# Patient Record
Sex: MALE | Race: WHITE | URBAN - METROPOLITAN AREA
[De-identification: names, ages, dates, MRNs, and addresses within clinical notes are randomized per-mention and may not be internally consistent; named-entity substitution may affect disease eponyms.]

---

## 2017-07-20 ENCOUNTER — OFFICE VISIT (OUTPATIENT)
Dept: OPHTHALMOLOGY | Facility: CLINIC | Age: 67
End: 2017-07-20
Attending: OPHTHALMOLOGY
Payer: COMMERCIAL

## 2017-07-20 DIAGNOSIS — H40.1130 PRIMARY OPEN ANGLE GLAUCOMA OF BOTH EYES, UNSPECIFIED GLAUCOMA STAGE: ICD-10-CM

## 2017-07-20 DIAGNOSIS — H25.13 SENILE NUCLEAR SCLEROSIS, BILATERAL: ICD-10-CM

## 2017-07-20 DIAGNOSIS — H40.003 GLAUCOMA SUSPECT, BILATERAL: Primary | ICD-10-CM

## 2017-07-20 PROCEDURE — 92083 EXTENDED VISUAL FIELD XM: CPT | Mod: ZF | Performed by: OPHTHALMOLOGY

## 2017-07-20 PROCEDURE — 99213 OFFICE O/P EST LOW 20 MIN: CPT | Mod: ZF

## 2017-07-20 PROCEDURE — 92015 DETERMINE REFRACTIVE STATE: CPT | Mod: ZF

## 2017-07-20 PROCEDURE — 92133 CPTRZD OPH DX IMG PST SGM ON: CPT | Mod: ZF | Performed by: OPHTHALMOLOGY

## 2017-07-20 RX ORDER — TIMOLOL MALEATE 6.8 MG/ML
SOLUTION/ DROPS OPHTHALMIC
COMMUNITY

## 2017-07-20 RX ORDER — LORATADINE 10 MG/1
10 TABLET ORAL
COMMUNITY

## 2017-07-20 ASSESSMENT — REFRACTION_WEARINGRX
OS_ADD: +2.50
OS_AXIS: 095
OS_AXIS: 085
OS_ADD: +2.25
OS_SPHERE: -3.50
OS_CYLINDER: +0.50
OD_SPHERE: -3.50
OS_SPHERE: -5.50
OD_ADD: +2.25
OS_CYLINDER: +0.50
OS_AXIS: 085
OD_CYLINDER: SPHERE
OD_SPHERE: -5.50
OD_CYLINDER: +0.50
OD_SPHERE: -5.25
OD_AXIS: 065
OS_CYLINDER: +0.25
OD_ADD: +2.50
OS_SPHERE: -5.50
OD_CYLINDER: SPHERE

## 2017-07-20 ASSESSMENT — VISUAL ACUITY
CORRECTION_TYPE: GLASSES
OD_CC: 20/20
OS_CC: 20/25
OS_CC+: -
METHOD: SNELLEN - LINEAR
OD_CC+: -2

## 2017-07-20 ASSESSMENT — CONF VISUAL FIELD
OS_NORMAL: 1
METHOD: COUNTING FINGERS
OD_NORMAL: 1

## 2017-07-20 ASSESSMENT — PACHYMETRY
OS_CT(UM): 624
OD_CT(UM): 636

## 2017-07-20 ASSESSMENT — REFRACTION_MANIFEST
OS_AXIS: 120
OS_CYLINDER: +0.25
OD_CYLINDER: +0.50
OS_ADD: +2.50
OD_SPHERE: -5.00
OD_AXIS: 065
OD_ADD: +2.50
OS_SPHERE: -4.25

## 2017-07-20 ASSESSMENT — EXTERNAL EXAM - RIGHT EYE: OD_EXAM: NORMAL

## 2017-07-20 ASSESSMENT — CUP TO DISC RATIO
OS_RATIO: 0.75
OD_RATIO: 0.7

## 2017-07-20 ASSESSMENT — TONOMETRY
IOP_METHOD: APPLANATION
OD_IOP_MMHG: 21
OS_IOP_MMHG: 23

## 2017-07-20 ASSESSMENT — SLIT LAMP EXAM - LIDS
COMMENTS: NORMAL
COMMENTS: NORMAL

## 2017-07-20 ASSESSMENT — EXTERNAL EXAM - LEFT EYE: OS_EXAM: NORMAL

## 2017-07-20 NOTE — PROGRESS NOTES
1)Glc Suspect -- started on gtts in 2017 by Dr Sunshine after ?prog on OCT --  K pachy: 636/624 Tmax:27/27 HVF: Fluct CDR:0.7/0.75 HRT/OCT: Mild RNFl thinning FHX of Glc:No Gonio:open Intolerant to: Asthma/COPD:No Steroid Use: No Kidney Stones: No Sulfa Allergy: No IOP targets: L-M 20s -- IOP good -- needs repeat Photos  2)NS OU  3)MARKO    Patient will return to clinic in 4-6 months with repeat IOP check and undilated iris check and gonioscopy.  A long discussion of the risks, benefits, and alternatives including potential treatment and management options were had with patient and a decision was made to trial off drops.  Patient will discontinue and hold onto the drops 2 months prior to his next appointment.    Attending Physician Attestation:  Complete documentation of historical and exam elements from today's encounter can be found in the full encounter summary report (not reduplicated in this progress note). I personally obtained the chief complaint(s) and history of present illness.  I confirmed and edited as necessary the review of systems, past medical/surgical history, family history, social history, and examination findings as documented by others; and I examined the patient myself. I personally reviewed the relevant tests, images, and reports as documented above. I formulated and edited as necessary the assessment and plan and discussed the findings and management plan with the patient and family.  - Ivette Cline MD

## 2017-07-20 NOTE — PATIENT INSTRUCTIONS
Patient will return to clinic in 4-6 months with repeat IOP check and undilated iris check and gonioscopy.  A long discussion of the risks, benefits, and alternatives including potential treatment and management options were had with patient and a decision was made to trial off drops.  Patient will discontinue and hold onto the drops 2 months prior to his next appointment.    Patient will start warm compresses 2x/day, increase dietary flax seed/fish oil dietary supplementation, and start artificial tears 4-6x/day as needed for burning, tearing, mattering, foreign body sensation, blurred vision, etc.  Artifical tear drops are available over the counter. Below are a list of the some of the drops available:  Refresh   Systane  Theratears  Genteal  Blink  Optive      Please avoid Visine (unless Visine Puretears), Murine or Cleareyes or Puralube tear drops.    These have ingredients that take the red out and but actually increase dryness and redness of the eyes over time

## 2017-07-20 NOTE — NURSING NOTE
Chief Complaints and History of Present Illnesses   Patient presents with     Consult For     POAG     HPI    Affected eye(s):  Both   Symptoms:        Frequency:  Constant       Do you have eye pain now?:  No      Comments:  June 2016 Dx with POAG  States that the va has changed a bit at dist  +dry and water  Temi Wallace COT 8:39 AM July 20, 2017

## 2017-07-20 NOTE — MR AVS SNAPSHOT
After Visit Summary   7/20/2017    Juice Gomez    MRN: [id number]           Patient Information     Date Of Birth          1950        Visit Information        Provider Department      7/20/2017 9:00 AM Ivette Cline MD [facility]        Today's Diagnoses     Glaucoma suspect, bilateral    -  1    Primary open angle glaucoma of both eyes, unspecified glaucoma stage        Senile nuclear sclerosis, bilateral          Care Instructions    Patient will return to clinic in 4-6 months with repeat IOP check and undilated iris check and gonioscopy.  A long discussion of the risks, benefits, and alternatives including potential treatment and management options were had with patient and a decision was made to trial off drops.  Patient will discontinue and hold onto the drops 2 months prior to his next appointment.    Patient will start warm compresses 2x/day, increase dietary flax seed/fish oil dietary supplementation, and start artificial tears 4-6x/day as needed for burning, tearing, mattering, foreign body sensation, blurred vision, etc.  Artifical tear drops are available over the counter. Below are a list of the some of the drops available:  Refresh   Systane  Theratears  Genteal  Blink  Optive      Please avoid Visine (unless Visine Puretears), Murine or Cleareyes or Puralube tear drops.    These have ingredients that take the red out and but actually increase dryness and redness of the eyes over time            Follow-ups after your visit        Follow-up notes from your care team     Return 4-6 months with repeat IOP check and undilated iris check and gonioscopy.      Who to contact     Please call your clinic at [phone] to:    Ask questions about your health    Make or cancel appointments    Discuss your medicines    Learn about your test results    Speak to your doctor   If you have compliments or concerns about an experience at your clinic, or if you wish to file a complaint,  please contact [facility] Patient Relations at [phone] or email us at [email or web address]         Additional Information About Your Visit        [employer] Information     [employer] gives you secure access to your electronic health record. If you see a primary care provider, you can also send messages to your care team and make appointments. If you have questions, please call your primary care clinic.  If you do not have a primary care provider, please call [phone] and they will assist you.      [employer] is an electronic gateway that provides easy, online access to your medical records. With [employer], you can request a clinic appointment, read your test results, renew a prescription or communicate with your care team.     To access your existing account, please contact your [facility] or call [phone] for assistance.        Care EveryWhere ID     This is your Care EveryWhere ID. This could be used by other organizations to access your [place] medical records  [id number]         Blood Pressure from Last 3 Encounters:   No data found for BP    Weight from Last 3 Encounters:   No data found for Wt              We Performed the Following     OCT Optic Nerve RNFL Spectralis OU (both eyes)     OVF 24-2 Dynamic OU     Pachymetry OU (both eyes)        Primary Care Provider Office Phone # Fax #    Oseas Figueroa [phone]       [facility] [address] [facility] [address]        Equal Access to Services     LALIT HINOJOSA : Hadii aad ku hadasho Sochristopher, waaxda luqadaha, qaybta kaalmada adeleonidasyada, marina andres . So [facility] [place] [facility] [phone].    ATENCIÓN: Si habla español, tiene a werner disposición servicios gratuitos de asistencia lingüística. Llame al [phone].    We comply with applicable federal civil rights laws and [place] laws. We do not discriminate on the basis of race, color, national  origin, age, disability sex, sexual orientation or gender identity.            Thank you!     Thank you for choosing EYE CLINIC  for your care. Our goal is always to provide you with excellent care. Hearing back from our patients is one way we can continue to improve our services. Please take a few minutes to complete the written survey that you may receive in the mail after your visit with us. Thank you!             Your Updated Medication List - Protect others around you: Learn how to safely use, store and throw away your medicines at www.disposemymeds.org.          This list is accurate as of: 7/20/17 10:10 AM.  Always use your most recent med list.                   Brand Name Dispense Instructions for use Diagnosis    ARTIFICIAL TEAR OP      Apply to eye as needed        loratadine 10 MG tablet    CLARITIN     Take 10 mg by mouth    Primary open angle glaucoma of both eyes, unspecified glaucoma stage       METAMUCIL 30.9 % Powd   Generic drug:  psyllium      1 tsp in am        MULTIVITAMIN ADULT PO           timolol maleate 0.5 % opthalmic solution    ISTALOL      Primary open angle glaucoma of both eyes, unspecified glaucoma stage

## 2018-01-25 ENCOUNTER — OFFICE VISIT (OUTPATIENT)
Dept: OPHTHALMOLOGY | Facility: CLINIC | Age: 68
End: 2018-01-25
Attending: OPHTHALMOLOGY
Payer: MEDICARE

## 2018-01-25 DIAGNOSIS — H25.13 SENILE NUCLEAR SCLEROSIS, BILATERAL: ICD-10-CM

## 2018-01-25 DIAGNOSIS — H40.003 GLAUCOMA SUSPECT, BILATERAL: Primary | ICD-10-CM

## 2018-01-25 PROCEDURE — 92020 GONIOSCOPY: CPT | Mod: ZF | Performed by: OPHTHALMOLOGY

## 2018-01-25 PROCEDURE — G0463 HOSPITAL OUTPT CLINIC VISIT: HCPCS | Mod: ZF

## 2018-01-25 ASSESSMENT — REFRACTION_WEARINGRX
OS_SPHERE: -4.25
OD_CYLINDER: +0.75
OS_AXIS: 104
OS_CYLINDER: +0.25
OD_ADD: +2.50
OD_AXIS: 073
OD_SPHERE: -5.00
OS_ADD: +2.50

## 2018-01-25 ASSESSMENT — GONIOSCOPY
OS_INFERIOR: 4
OD_NASAL: 4
OD_SUPERIOR: 4
OD_INFERIOR: 4
OD_TEMPORAL: 4
ADDITIONAL_COMMENTS: 1-2+ TMP OU
OS_TEMPORAL: 4
OS_NASAL: 4
OS_SUPERIOR: 4

## 2018-01-25 ASSESSMENT — CONF VISUAL FIELD
OS_NORMAL: 1
OD_NORMAL: 1

## 2018-01-25 ASSESSMENT — VISUAL ACUITY
OD_CC+: -1
OS_CC: 20/20
METHOD: SNELLEN - LINEAR
CORRECTION_TYPE: GLASSES
OD_CC: 20/20

## 2018-01-25 ASSESSMENT — TONOMETRY
OD_IOP_MMHG: 25
OS_IOP_MMHG: 24
IOP_METHOD: APPLANATION

## 2018-01-25 ASSESSMENT — EXTERNAL EXAM - LEFT EYE: OS_EXAM: NORMAL

## 2018-01-25 ASSESSMENT — SLIT LAMP EXAM - LIDS
COMMENTS: NORMAL
COMMENTS: NORMAL

## 2018-01-25 ASSESSMENT — EXTERNAL EXAM - RIGHT EYE: OD_EXAM: NORMAL

## 2018-01-25 NOTE — NURSING NOTE
Chief Complaints and History of Present Illnesses   Patient presents with     Follow Up For     Glaucoma suspect     HPI    Affected eye(s):  Both   Symptoms:        Duration:  1 year   Frequency:  Constant       Do you have eye pain now?:  No      Comments:  Pt. States that he is here for IOP check after stopping timolol in September 17th.  No change in VA BE.  Still dryness BE.  Charisma De Leon COT 9:39 AM January 25, 2018                         15-Nov-2017 14:28

## 2018-01-25 NOTE — PATIENT INSTRUCTIONS
Patient will return to clinic in 6 months with repeat IOP check, visual field test, dilated eye exam and OCT with RNFl analysis.  A long discussion of the risks, benefits, and alternatives including potential treatment and management options were had with patient and a decision was made to remain off drops.

## 2018-01-25 NOTE — MR AVS SNAPSHOT
After Visit Summary   1/25/2018    Juice Gomez    MRN: [id number]           Patient Information     Date Of Birth          1950        Visit Information        Provider Department      1/25/2018 9:15 AM Ivette Cline MD Eye Clinic        Today's Diagnoses     Glaucoma suspect, bilateral    -  1    Senile nuclear sclerosis, bilateral          Care Instructions    Patient will return to clinic in 6 months with repeat IOP check, visual field test, dilated eye exam and OCT with RNFl analysis.  A long discussion of the risks, benefits, and alternatives including potential treatment and management options were had with patient and a decision was made to remain off drops.           Follow-ups after your visit        Follow-up notes from your care team     Return  6 months with repeat IOP check, visual field test, dilated eye exam and OCT with RNFl analysis..      Your next 10 appointments already scheduled     Jun 21, 2018  8:00 AM CDT   RETURN GLAUCOMA with Ivette Cline MD   Eye Clinic ([facility])    [address]  [address]  [address]  [facility] [id number] [phone]              Who to contact     Please call your clinic at [phone] to:    Ask questions about your health    Make or cancel appointments    Discuss your medicines    Learn about your test results    Speak to your doctor   If you have compliments or concerns about an experience at your clinic, or if you wish to file a complaint, please contact [facility] Patient Relations at [phone] or email us at [email or web address]         Additional Information About Your Visit        MyChart Information     [employer] gives you secure access to your electronic health record. If you see a primary care provider, you can also send messages to your care team and make appointments. If you have questions, please call your primary care clinic.  If you do not  have a primary care provider, please call [phone] and they will assist you.      [employer] is an electronic gateway that provides easy, online access to your medical records. With [employer], you can request a clinic appointment, read your test results, renew a prescription or communicate with your care team.     To access your existing account, please contact your [facility] or call [phone] for assistance.        Care EveryWhere ID     This is your Care EveryWhere ID. This could be used by other organizations to access your [place] medical records  [id number]         Blood Pressure from Last 3 Encounters:   No data found for BP    Weight from Last 3 Encounters:   No data found for Wt              We Performed the Following     GONIOSCOPY        Primary Care Provider Office Phone # Fax #    Oseas Figueroa [phone]       [employer] [address] [facility] [address] [facility] [address]        Equal Access to Services     [facility] : Hadii aad ku hadasho Soomaali, waaxda luqadaha, qaybta kaalmada adeegyada, waxay idiin hayaan jeaneth ribeiroarashanelle andres . So [facility] [phone].    ATENCIÓN: Si habla español, tiene a werner disposición servicios gratuitos de asistencia lingüística. Catalina al [phone].    We comply with applicable federal civil rights laws and [place] laws. We do not discriminate on the basis of race, color, national origin, age, disability, sex, sexual orientation, or gender identity.            Thank you!     Thank you for choosing EYE CLINIC  for your care. Our goal is always to provide you with excellent care. Hearing back from our patients is one way we can continue to improve our services. Please take a few minutes to complete the written survey that you may receive in the mail after your visit with us. Thank you!             Your Updated Medication List - Protect others around you: Learn how to safely use, store and throw away your medicines at  www.disposemymeds.org.          This list is accurate as of 1/25/18 10:52 AM.  Always use your most recent med list.                   Brand Name Dispense Instructions for use Diagnosis    ARTIFICIAL TEAR OP      Apply to eye as needed        loratadine 10 MG tablet    CLARITIN     Take 10 mg by mouth    Primary open angle glaucoma of both eyes, unspecified glaucoma stage       METAMUCIL 30.9 % Powd   Generic drug:  psyllium      1 tsp in am        MULTIVITAMIN ADULT PO           timolol maleate 0.5 % opthalmic solution    ISTALOL      Primary open angle glaucoma of both eyes, unspecified glaucoma stage

## 2018-01-25 NOTE — PROGRESS NOTES
1)Glc Suspect -- started on gtts in 2017 by Dr Sunshine after ?prog on OCT --  K pachy: 636/624 Tmax:27/27 HVF: Fluct CDR:0.7/0.75 HRT/OCT: Mild RNFl thinning FHX of Glc:No Gonio:open Intolerant to: Asthma/COPD:No Steroid Use: No Kidney Stones: No Sulfa Allergy: No IOP targets: L-M 20s -- IOP good -- needs repeat Photos -- IOP borderline off T1/2  2)NS OU  3)MARKO    Patient will return to clinic in 6 months with repeat IOP check, visual field test, dilated eye exam and OCT with RNFl analysis.  A long discussion of the risks, benefits, and alternatives including potential treatment and management options were had with patient and a decision was made to remain off drops.     Attending Physician Attestation:  Complete documentation of historical and exam elements from today's encounter can be found in the full encounter summary report (not reduplicated in this progress note). I personally obtained the chief complaint(s) and history of present illness.  I confirmed and edited as necessary the review of systems, past medical/surgical history, family history, social history, and examination findings as documented by others; and I examined the patient myself. I personally reviewed the relevant tests, images, and reports as documented above. I formulated and edited as necessary the assessment and plan and discussed the findings and management plan with the patient and family.  - Ivette Cline MD

## 2018-06-21 ENCOUNTER — OFFICE VISIT (OUTPATIENT)
Dept: OPHTHALMOLOGY | Facility: CLINIC | Age: 68
End: 2018-06-21
Attending: OPHTHALMOLOGY
Payer: MEDICARE

## 2018-06-21 DIAGNOSIS — H40.003 GLAUCOMA SUSPECT OF BOTH EYES: Primary | ICD-10-CM

## 2018-06-21 DIAGNOSIS — H25.13 SENILE NUCLEAR SCLEROSIS, BILATERAL: ICD-10-CM

## 2018-06-21 PROCEDURE — G0463 HOSPITAL OUTPT CLINIC VISIT: HCPCS | Mod: ZF

## 2018-06-21 PROCEDURE — 92133 CPTRZD OPH DX IMG PST SGM ON: CPT | Mod: ZF | Performed by: OPHTHALMOLOGY

## 2018-06-21 PROCEDURE — 92083 EXTENDED VISUAL FIELD XM: CPT | Mod: ZF | Performed by: OPHTHALMOLOGY

## 2018-06-21 ASSESSMENT — VISUAL ACUITY
OD_CC+: -1
CORRECTION_TYPE: GLASSES
OS_CC: 20/20
OD_CC: 20/20
METHOD: SNELLEN - LINEAR

## 2018-06-21 ASSESSMENT — EXTERNAL EXAM - LEFT EYE: OS_EXAM: NORMAL

## 2018-06-21 ASSESSMENT — CUP TO DISC RATIO
OS_RATIO: 0.75
OD_RATIO: 0.7

## 2018-06-21 ASSESSMENT — REFRACTION_WEARINGRX
OS_ADD: +2.50
OD_ADD: +2.50
OD_SPHERE: -5.00
OD_CYLINDER: +0.75
OS_AXIS: 104
OS_SPHERE: -4.25
OS_CYLINDER: +0.25
OD_AXIS: 073

## 2018-06-21 ASSESSMENT — TONOMETRY
OD_IOP_MMHG: 22
OS_IOP_MMHG: 21
IOP_METHOD: APPLANATION

## 2018-06-21 ASSESSMENT — SLIT LAMP EXAM - LIDS
COMMENTS: NORMAL
COMMENTS: NORMAL

## 2018-06-21 ASSESSMENT — EXTERNAL EXAM - RIGHT EYE: OD_EXAM: NORMAL

## 2018-06-21 ASSESSMENT — CONF VISUAL FIELD
OS_NORMAL: 1
OD_NORMAL: 1

## 2018-06-21 NOTE — NURSING NOTE
Chief Complaints and History of Present Illnesses   Patient presents with     Follow Up For     Glaucoma suspect, bilateral     HPI    Affected eye(s):  Both   Symptoms:        Duration:  6 months   Frequency:  Constant       Do you have eye pain now?:  No      Comments:  Pt. States that he has been getting more episodes of pixilated vision.  No c/o comfort BE.  No flashes or floaters BE.  Charisma De Leon COT 8:19 AM June 21, 2018

## 2018-06-21 NOTE — PROGRESS NOTES
1)Glc Suspect -- started on gtts in 2017 by Dr Sunshine after ?prog on OCT --  K pachy: 636/624 Tmax:27/27 HVF: Fluct CDR:0.7/0.75 HRT/OCT: Mild RNFl thinning FHX of Glc:No Gonio:open Intolerant to: Asthma/COPD:No Steroid Use: No Kidney Stones: No Sulfa Allergy: No IOP targets: L-M 20s -- IOP good -- needs repeat Photos -- IOP borderline off T1/2  2)NS OU  3)MARKO  4)?Ocular Migraines    Patient will return to clinic in 8-12 months with repeat IOP check, visual field test, dilated eye exam and disc photos.  A long discussion of the risks, benefits, and alternatives including potential treatment and management options were had with patient and a decision was made to remain off drops.     Resident Note:  Reports pixelated vision 10-20 minutes now 1-2x per week, otherwise no changes since last visit.  IOP 22/21 today  Visual fields stable, fluct  OCT stable  Continue off of drops.   Discuss pixelated vision with PCP or see headache specialist  Return to clinic in 6 months for IOP check      Wallace Kam MD  Ophthalmology, PGY-3    Attending Physician Attestation:  Complete documentation of historical and exam elements from today's encounter can be found in the full encounter summary report (not reduplicated in this progress note). I personally obtained the chief complaint(s) and history of present illness.  I confirmed and edited as necessary the review of systems, past medical/surgical history, family history, social history, and examination findings as documented by others; and I examined the patient myself. I personally reviewed the relevant tests, images, and reports as documented above. I formulated and edited as necessary the assessment and plan and discussed the findings and management plan with the patient and family.  - Ivette Cline MD

## 2018-06-21 NOTE — MR AVS SNAPSHOT
After Visit Summary   6/21/2018    Juice Gomez    MRN: [id number]           Patient Information     Date Of Birth          1950        Visit Information        Provider Department      6/21/2018 8:00 AM Ivette Cline MD [facility]        Today's Diagnoses     Glaucoma suspect of both eyes    -  1    Senile nuclear sclerosis, bilateral          Care Instructions    Patient will return to clinic in 8-12 months with repeat IOP check, visual field test, dilated eye exam and disc photos.  A long discussion of the risks, benefits, and alternatives including potential treatment and management options were had with patient and a decision was made to remain off drops.           Follow-ups after your visit        Follow-up notes from your care team     Return 8-12 months with repeat IOP check, visual field test, dilated eye exam and disc photos. .      Who to contact     Please call your clinic at [phone] to:    Ask questions about your health    Make or cancel appointments    Discuss your medicines    Learn about your test results    Speak to your doctor            Additional Information About Your Visit        [employer] Information     [employer] gives you secure access to your electronic health record. If you see a primary care provider, you can also send messages to your care team and make appointments. If you have questions, please call your primary care clinic.  If you do not have a primary care provider, please call [phone] and they will assist you.      [employer] is an electronic gateway that provides easy, online access to your medical records. With [employer], you can request a clinic appointment, read your test results, renew a prescription or communicate with your care team.     To access your existing account, please contact your [facility] or call [phone] for assistance.        Care EveryWhere ID     This is your Care EveryWhere ID. This could be  used by other organizations to access your [place] medical records  [id number]         Blood Pressure from Last 3 Encounters:   No data found for BP    Weight from Last 3 Encounters:   No data found for Wt              We Performed the Following     OCT Optic Nerve RNFL Spectralis OU (both eyes)     OVF 24-2 Dynamic OU        Primary Care Provider Office Phone # Fax #    Oseas Figueroa [phone]       [facility] [address]  [facility] [address]        Equal Access to Services     LALIT HINOJOSA : Hadii aad ku hadasho Soomaali, waaxda luqadaha, qaybta kaalmada adeegyada, waxay idiin hayaan adeeg kharash la'aan . So [facility] [phone].    ATENCIÓN: Si habla español, tiene a werner disposición servicios gratuitos de asistencia lingüística. [facility] [phone].    We comply with applicable federal civil rights laws and [place] laws. We do not discriminate on the basis of race, color, national origin, age, disability, sex, sexual orientation, or gender identity.            Thank you!     Thank you for choosing EYE CLINIC  for your care. Our goal is always to provide you with excellent care. Hearing back from our patients is one way we can continue to improve our services. Please take a few minutes to complete the written survey that you may receive in the mail after your visit with us. Thank you!             Your Updated Medication List - Protect others around you: Learn how to safely use, store and throw away your medicines at www.disposemymeds.org.          This list is accurate as of 6/21/18  9:20 AM.  Always use your most recent med list.                   Brand Name Dispense Instructions for use Diagnosis    ARTIFICIAL TEAR OP      Apply to eye as needed        loratadine 10 MG tablet    CLARITIN     Take 10 mg by mouth    Primary open angle glaucoma of both eyes, unspecified glaucoma stage       METAMUCIL 30.9 % Powd   Generic drug:  psyllium      1 tsp in am        MULTIVITAMIN  ADULT PO           timolol maleate 0.5 % opthalmic solution    ISTALOL      Primary open angle glaucoma of both eyes, unspecified glaucoma stage

## 2018-06-21 NOTE — PATIENT INSTRUCTIONS
Patient will return to clinic in 8-12 months with repeat IOP check, visual field test, dilated eye exam and disc photos.  A long discussion of the risks, benefits, and alternatives including potential treatment and management options were had with patient and a decision was made to remain off drops.

## 2019-05-03 ENCOUNTER — TELEPHONE (OUTPATIENT)
Dept: OPHTHALMOLOGY | Facility: CLINIC | Age: 69
End: 2019-05-03

## 2019-05-03 NOTE — TELEPHONE ENCOUNTER
Pt last seen in June 2018  No new eye concerns  Scheduled one year f/u June 27th   Pt aware of date/time/location  Javier Fitzgerald RN 11:30 AM 05/03/19      [facility]    Phone Message    May a detailed message be left on voicemail: yes    Reason for Call: Other: Pt is needing to schedule an appt. with Dr. Ivette Cline for glaucoma follow up, visual field test, disk photos,dilated eye exam and IOP check, please call he pt back to schedule this appt. Thank you     Action Taken: Message routed to:  [facility] Eye

## 2019-06-27 ENCOUNTER — OFFICE VISIT (OUTPATIENT)
Dept: OPHTHALMOLOGY | Facility: CLINIC | Age: 69
End: 2019-06-27
Attending: OPHTHALMOLOGY
Payer: MEDICARE

## 2019-06-27 DIAGNOSIS — H25.13 SENILE NUCLEAR SCLEROSIS, BILATERAL: ICD-10-CM

## 2019-06-27 DIAGNOSIS — H40.003 GLAUCOMA SUSPECT OF BOTH EYES: Primary | ICD-10-CM

## 2019-06-27 PROCEDURE — G0463 HOSPITAL OUTPT CLINIC VISIT: HCPCS | Mod: ZF

## 2019-06-27 PROCEDURE — 92083 EXTENDED VISUAL FIELD XM: CPT | Mod: ZF | Performed by: OPHTHALMOLOGY

## 2019-06-27 PROCEDURE — 92250 FUNDUS PHOTOGRAPHY W/I&R: CPT | Mod: ZF | Performed by: OPHTHALMOLOGY

## 2019-06-27 ASSESSMENT — REFRACTION_WEARINGRX
OS_AXIS: 104
OS_SPHERE: -4.25
OS_CYLINDER: +0.25
OD_SPHERE: -5.00
OD_AXIS: 073
OS_ADD: +2.50
OD_ADD: +2.50
OD_CYLINDER: +0.75

## 2019-06-27 ASSESSMENT — CONF VISUAL FIELD
METHOD: COUNTING FINGERS
OS_NORMAL: 1
OD_NORMAL: 1

## 2019-06-27 ASSESSMENT — TONOMETRY
OS_IOP_MMHG: 24
OD_IOP_MMHG: 23
OD_IOP_MMHG: 24
IOP_METHOD: APPLANATION
OS_IOP_MMHG: 23
IOP_METHOD: APPLANATION

## 2019-06-27 ASSESSMENT — EXTERNAL EXAM - RIGHT EYE: OD_EXAM: NORMAL

## 2019-06-27 ASSESSMENT — VISUAL ACUITY
OD_CC: 20/20
METHOD: SNELLEN - LINEAR
OS_CC: 20/20
CORRECTION_TYPE: GLASSES
OD_CC+: -3

## 2019-06-27 ASSESSMENT — CUP TO DISC RATIO
OS_RATIO: 0.75
OD_RATIO: 0.7

## 2019-06-27 ASSESSMENT — EXTERNAL EXAM - LEFT EYE: OS_EXAM: NORMAL

## 2019-06-27 ASSESSMENT — SLIT LAMP EXAM - LIDS
COMMENTS: NORMAL
COMMENTS: NORMAL

## 2019-06-27 NOTE — PROGRESS NOTES
1)Glc Suspect -- started on gtts in 2017 by Dr Sunshine after ?prog on OCT --  K pachy: 636/624 Tmax:27/27 HVF: Fluct CDR:0.7/0.75 HRT/OCT: Mild RNFl thinning FHX of Glc:No Gonio:open Intolerant to: Asthma/COPD:No Steroid Use: No Kidney Stones: No Sulfa Allergy: No IOP targets: L-M 20s -- IOP good -- needs repeat Photos -- IOP borderline off T1/2  2)NS OU  3)MARKO  4)?Ocular Migraines    Patient will return to clinic in 8-12 months with repeat IOP check, visual field test, dilated eye exam and OCT with RNFL analysis.  A long discussion of the risks, benefits, and alternatives including potential treatment and management options were had with patient and a decision was made to remain off drops.         Attending Physician Attestation:  Complete documentation of historical and exam elements from today's encounter can be found in the full encounter summary report (not reduplicated in this progress note). I personally obtained the chief complaint(s) and history of present illness.  I confirmed and edited as necessary the review of systems, past medical/surgical history, family history, social history, and examination findings as documented by others; and I examined the patient myself. I personally reviewed the relevant tests, images, and reports as documented above. I formulated and edited as necessary the assessment and plan and discussed the findings and management plan with the patient and family.  - Ivette Cline MD

## 2019-06-27 NOTE — NURSING NOTE
Chief Complaints and History of Present Illnesses   Patient presents with     Glaucoma Follow-Up      Chief Complaint(s) and History of Present Illness(es)     Glaucoma Follow-Up     Laterality: both eyes              Comments     1 year follow up of glaucoma each eye.  No changes in vision within the last year per patient.    Eye meds: refresh tears each eye   ENMA Blevins 6/27/2019 7:51 AM

## 2019-06-27 NOTE — PATIENT INSTRUCTIONS
Patient will return to clinic in 8-12 months with repeat IOP check, visual field test, dilated eye exam and OCT with RNFL analysis.  A long discussion of the risks, benefits, and alternatives including potential treatment and management options were had with patient and a decision was made to remain off drops.

## 2019-11-04 ENCOUNTER — HEALTH MAINTENANCE LETTER (OUTPATIENT)
Age: 69
End: 2019-11-04

## 2020-02-16 ENCOUNTER — HEALTH MAINTENANCE LETTER (OUTPATIENT)
Age: 70
End: 2020-02-16

## 2020-04-21 ENCOUNTER — TELEPHONE (OUTPATIENT)
Dept: OPHTHALMOLOGY | Facility: CLINIC | Age: 70
End: 2020-04-21

## 2020-04-21 NOTE — TELEPHONE ENCOUNTER
Pt received message from  to reschedule yearly visit to June.  Reviewing schedule for Dr. Cline-- mostly full in June    No new vision changes/symptoms  Ok to schedule into July for yearly visit and call for any new symptoms/vision changes    Pt comfortable with plan-- will keep late July appt as scheduled    Javier Fitzgerald RN 8:12 AM 04/22/20        [facility]    Phone Message    May a detailed message be left on voicemail: yes     Reason for Call: Other: Pt was told to schedule a replacement appt in June.  There were no availabilities until 7/28.  Please check to see if Dr Cline needs a Sara appt and let Pt know what his decision is please.     Action Taken: Message routed to:  [facility] ([facility] eye clinic    Travel Screening: Not Applicable

## 2020-07-28 ENCOUNTER — OFFICE VISIT (OUTPATIENT)
Dept: OPHTHALMOLOGY | Facility: CLINIC | Age: 70
End: 2020-07-28
Attending: OPHTHALMOLOGY
Payer: MEDICARE

## 2020-07-28 DIAGNOSIS — H25.13 SENILE NUCLEAR SCLEROSIS, BILATERAL: ICD-10-CM

## 2020-07-28 DIAGNOSIS — H40.003 GLAUCOMA SUSPECT OF BOTH EYES: Primary | ICD-10-CM

## 2020-07-28 PROCEDURE — 92015 DETERMINE REFRACTIVE STATE: CPT | Mod: ZF

## 2020-07-28 PROCEDURE — G0463 HOSPITAL OUTPT CLINIC VISIT: HCPCS | Mod: ZF

## 2020-07-28 PROCEDURE — 92083 EXTENDED VISUAL FIELD XM: CPT | Mod: ZF | Performed by: OPHTHALMOLOGY

## 2020-07-28 PROCEDURE — 92133 CPTRZD OPH DX IMG PST SGM ON: CPT | Mod: ZF | Performed by: OPHTHALMOLOGY

## 2020-07-28 ASSESSMENT — EXTERNAL EXAM - LEFT EYE: OS_EXAM: NORMAL

## 2020-07-28 ASSESSMENT — VISUAL ACUITY
METHOD: SNELLEN - LINEAR
OS_CC: 20/20
OD_CC: 20/20
OS_CC+: -1
OD_CC+: -3
CORRECTION_TYPE: GLASSES

## 2020-07-28 ASSESSMENT — REFRACTION_MANIFEST
OD_ADD: +2.50
OD_SPHERE: -5.00
OS_AXIS: 120
OS_ADD: +2.50
OD_AXIS: 065
OD_CYLINDER: +0.75
OS_SPHERE: -4.25
OS_CYLINDER: +0.25

## 2020-07-28 ASSESSMENT — CONF VISUAL FIELD
OS_NORMAL: 1
OD_NORMAL: 1
METHOD: COUNTING FINGERS

## 2020-07-28 ASSESSMENT — REFRACTION_WEARINGRX
OD_ADD: +2.50
OS_SPHERE: -4.25
OD_CYLINDER: +0.75
OD_SPHERE: -5.00
OS_CYLINDER: +0.25
OD_AXIS: 073
OS_AXIS: 104
OS_ADD: +2.50

## 2020-07-28 ASSESSMENT — EXTERNAL EXAM - RIGHT EYE: OD_EXAM: NORMAL

## 2020-07-28 ASSESSMENT — SLIT LAMP EXAM - LIDS
COMMENTS: NORMAL
COMMENTS: NORMAL

## 2020-07-28 ASSESSMENT — TONOMETRY
OD_IOP_MMHG: 17
OS_IOP_MMHG: 14
IOP_METHOD: APPLANATION

## 2020-07-28 ASSESSMENT — CUP TO DISC RATIO
OD_RATIO: 0.7
OS_RATIO: 0.8

## 2020-07-28 NOTE — NURSING NOTE
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  Negative for eye pain, tearing and discharge    Pain scale: 0/10    Comments: 1+ year follow up glaucoma suspect, both eyes              Comments     Pt denies any significant vision changes in either eye since last visit.  Pt complains of occasional dryness BE.  Denies any pain, pressure, discharge, and tearing.  Ocular Meds: AT's PRN BE    Lisa Duran OT 1:39 PM July 28, 2020

## 2020-07-28 NOTE — PROGRESS NOTES
1)Glc Suspect -- started on gtts in 2017 by Dr Sunshine after ?prog on OCT --  K pachy: 636/624 Tmax:27/27 HVF: Fluct CDR:0.7/0.75 HRT/OCT:OD:RNFL WNL and OS:Mild RNFl thinning FHX of Glc:No Gonio:open Intolerant to: Asthma/COPD:No Steroid Use: No Kidney Stones: No Sulfa Allergy: No IOP targets: L-M 20s -- IOP good -- needs repeat Photos -- IOP borderline off T1/2  2)NS OU  3)MARKO  4)?Ocular Migraines    Patient will return to clinic in 8-12 months with repeat IOP check, visual field test, dilated eye exam and OCT with RNFL analysis.  A long discussion of the risks, benefits, and alternatives including potential treatment and management options were had with patient and a decision was made to remain off drops.       Attending Physician Attestation:  Complete documentation of historical and exam elements from today's encounter can be found in the full encounter summary report (not reduplicated in this progress note). I personally obtained the chief complaint(s) and history of present illness.  I confirmed and edited as necessary the review of systems, past medical/surgical history, family history, social history, and examination findings as documented by others; and I examined the patient myself. I personally reviewed the relevant tests, images, and reports as documented above. I formulated and edited as necessary the assessment and plan and discussed the findings and management plan with the patient and family.  - Ivette Cline MD

## 2020-11-16 ENCOUNTER — HEALTH MAINTENANCE LETTER (OUTPATIENT)
Age: 70
End: 2020-11-16

## 2021-04-04 ENCOUNTER — HEALTH MAINTENANCE LETTER (OUTPATIENT)
Age: 71
End: 2021-04-04

## 2021-09-18 ENCOUNTER — HEALTH MAINTENANCE LETTER (OUTPATIENT)
Age: 71
End: 2021-09-18

## 2022-04-30 ENCOUNTER — HEALTH MAINTENANCE LETTER (OUTPATIENT)
Age: 72
End: 2022-04-30

## 2022-11-20 ENCOUNTER — HEALTH MAINTENANCE LETTER (OUTPATIENT)
Age: 72
End: 2022-11-20

## 2023-06-01 ENCOUNTER — HEALTH MAINTENANCE LETTER (OUTPATIENT)
Age: 73
End: 2023-06-01